# Patient Record
Sex: FEMALE
[De-identification: names, ages, dates, MRNs, and addresses within clinical notes are randomized per-mention and may not be internally consistent; named-entity substitution may affect disease eponyms.]

---

## 2021-08-30 PROBLEM — Z00.00 ENCOUNTER FOR PREVENTIVE HEALTH EXAMINATION: Status: ACTIVE | Noted: 2021-08-30

## 2021-09-15 ENCOUNTER — APPOINTMENT (OUTPATIENT)
Dept: GASTROENTEROLOGY | Facility: CLINIC | Age: 86
End: 2021-09-15
Payer: MEDICARE

## 2021-09-15 VITALS
HEART RATE: 98 BPM | SYSTOLIC BLOOD PRESSURE: 138 MMHG | WEIGHT: 107 LBS | HEIGHT: 66 IN | TEMPERATURE: 97.6 F | DIASTOLIC BLOOD PRESSURE: 75 MMHG | BODY MASS INDEX: 17.19 KG/M2

## 2021-09-15 DIAGNOSIS — Z86.2 PERSONAL HISTORY OF DISEASES OF THE BLOOD AND BLOOD-FORMING ORGANS AND CERTAIN DISORDERS INVOLVING THE IMMUNE MECHANISM: ICD-10-CM

## 2021-09-15 DIAGNOSIS — Z86.79 PERSONAL HISTORY OF OTHER DISEASES OF THE CIRCULATORY SYSTEM: ICD-10-CM

## 2021-09-15 DIAGNOSIS — R19.5 OTHER FECAL ABNORMALITIES: ICD-10-CM

## 2021-09-15 DIAGNOSIS — R19.8 OTHER SPECIFIED SYMPTOMS AND SIGNS INVOLVING THE DIGESTIVE SYSTEM AND ABDOMEN: ICD-10-CM

## 2021-09-15 DIAGNOSIS — R63.4 ABNORMAL WEIGHT LOSS: ICD-10-CM

## 2021-09-15 PROCEDURE — 99203 OFFICE O/P NEW LOW 30 MIN: CPT

## 2021-09-16 PROBLEM — R19.8 ALTERNATING CONSTIPATION AND DIARRHEA: Status: ACTIVE | Noted: 2021-09-16

## 2021-09-16 PROBLEM — Z86.79 HISTORY OF GIANT CELL ARTERITIS: Status: RESOLVED | Noted: 2021-09-15 | Resolved: 2021-09-16

## 2021-09-16 PROBLEM — Z86.2 HISTORY OF THALASSEMIA: Status: RESOLVED | Noted: 2021-09-16 | Resolved: 2021-09-16

## 2021-09-16 PROBLEM — Z86.79 HISTORY OF AORTIC VALVE STENOSIS: Status: RESOLVED | Noted: 2021-09-15 | Resolved: 2021-09-16

## 2021-09-16 PROBLEM — R19.5 POSITIVE FIT (FECAL IMMUNOCHEMICAL TEST): Status: ACTIVE | Noted: 2021-09-16

## 2021-09-16 RX ORDER — BUDESONIDE AND FORMOTEROL FUMARATE DIHYDRATE 160; 4.5 UG/1; UG/1
AEROSOL RESPIRATORY (INHALATION)
Refills: 0 | Status: ACTIVE | COMMUNITY

## 2021-09-16 RX ORDER — DENOSUMAB 60 MG/ML
INJECTION SUBCUTANEOUS
Refills: 0 | Status: ACTIVE | COMMUNITY

## 2021-09-16 RX ORDER — CHROMIUM 200 MCG
TABLET ORAL
Refills: 0 | Status: ACTIVE | COMMUNITY

## 2021-09-16 RX ORDER — LORATADINE 5 MG
17 TABLET,CHEWABLE ORAL
Refills: 0 | Status: ACTIVE | COMMUNITY

## 2021-09-16 RX ORDER — ALLOPURINOL 200 MG/1
TABLET ORAL
Refills: 0 | Status: ACTIVE | COMMUNITY

## 2021-09-16 RX ORDER — MULTIVITAMIN
TABLET ORAL
Refills: 0 | Status: ACTIVE | COMMUNITY

## 2021-09-16 RX ORDER — UMECLIDINIUM 62.5 UG/1
AEROSOL, POWDER ORAL
Refills: 0 | Status: ACTIVE | COMMUNITY

## 2021-09-16 RX ORDER — CLOPIDOGREL BISULFATE 75 MG/1
75 TABLET, FILM COATED ORAL
Refills: 0 | Status: ACTIVE | COMMUNITY

## 2021-09-16 RX ORDER — METHOTREXATE 2.5 MG/1
TABLET ORAL
Refills: 0 | Status: ACTIVE | COMMUNITY

## 2021-09-16 RX ORDER — PANTOPRAZOLE SODIUM 40 MG/1
40 TABLET, DELAYED RELEASE ORAL
Refills: 0 | Status: ACTIVE | COMMUNITY

## 2021-09-16 RX ORDER — LOSARTAN POTASSIUM 25 MG/1
25 TABLET, FILM COATED ORAL
Refills: 0 | Status: ACTIVE | COMMUNITY

## 2021-09-16 NOTE — PHYSICAL EXAM
[General Appearance - Alert] : alert [General Appearance - Well Nourished] : well nourished [General Appearance - Well-Appearing] : healthy appearing [Sclera] : the sclera and conjunctiva were normal [Outer Ear] : the ears and nose were normal in appearance [Neck Appearance] : the appearance of the neck was normal [] : no respiratory distress [Abdomen Soft] : soft [Abdomen Tenderness] : non-tender [Abdomen Mass (___ Cm)] : no abdominal mass palpated [Involuntary Movements] : no involuntary movements were seen [Skin Color & Pigmentation] : normal skin color and pigmentation [No Focal Deficits] : no focal deficits [Oriented To Time, Place, And Person] : oriented to person, place, and time [FreeTextEntry1] : LT sided longitudinal tubular structure

## 2021-09-16 NOTE — ASSESSMENT
[FreeTextEntry1] : 89 yo female with constipation/ diarrhea, weight loss; also hx of FIT+\par \par - Will check a CT scan of A/P first to evaluate for any intra-abdominal pathology\par - Will determine future steps pending CT scan \par - Will also send pt for a nutrition evaluation

## 2021-09-16 NOTE — HISTORY OF PRESENT ILLNESS
[FreeTextEntry1] : 87 yo female with multiple complaints -- hx of constipation/ diarrhea; also lost weight -- 120 to 107 lbs.  Stopped drinking Ensure secondary to bloating.  Even on Lactaid was having bloating, doing better with oatmeal.  Mostly eats starch and carbs.  Will have a couple days where she has loose stools.  One capful of MiraLAX daily.  Takes Trulance 3 mg daily as well.  + heartburn -- takes pantoprazole 20 mg bid.  +belches frequently.  No dark stools, yellow stool.  FIT was + twice -- 6/2021& 7/2021  Daughter is Dr. Blanca Solitario, pediatrician.  No recent CT abdomen.  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  No IBD.\par \par Moderna, February -- second dose\par \par Seamstress, retired